# Patient Record
Sex: FEMALE | Race: WHITE
[De-identification: names, ages, dates, MRNs, and addresses within clinical notes are randomized per-mention and may not be internally consistent; named-entity substitution may affect disease eponyms.]

---

## 2017-06-23 ENCOUNTER — HOSPITAL ENCOUNTER (OUTPATIENT)
Dept: HOSPITAL 76 - DI | Age: 67
Discharge: HOME | End: 2017-06-23
Attending: FAMILY MEDICINE
Payer: MEDICARE

## 2017-06-23 DIAGNOSIS — M50.321: ICD-10-CM

## 2017-06-23 DIAGNOSIS — M47.892: Primary | ICD-10-CM

## 2017-06-23 PROCEDURE — 72141 MRI NECK SPINE W/O DYE: CPT

## 2017-06-23 NOTE — MRI REPORT
EXAM:

MRI CERVICAL SPINE WITHOUT CONTRAST

 

EXAM DATE: 6/23/2017 12:13 PM.

 

CLINICAL HISTORY: RADICULOPATHY, CERVICAL REGION.

 

COMPARISONS: MR cervical spine 3/9/2011.

 

TECHNIQUE: Multiplanar, multisequence T1-weighted and fluid-sensitive sequences of the cervical spine
 without contrast. Other: None.

 

FINDINGS: 

Neurologic Structures: There is flattening of the ventral aspect of the cervical cord seen at C4-C5 a
nd C6-C7 with no definite T2 signal hyperintense lesion seen within the cord.

 

Alignment: There is straightening of the normal cervical lordosis. No definite spinal listhesis or sc
oliotic curvature. Findings are similar to MR 3/9/2011.

 

Bone Marrow: There is mild to moderate endplate degenerative changes seen at C4-C5, C6-C7, C7-T1 with
 mild loss of disk at the distal desiccation. Remaining cervical levels demonstrate mild degenerative
 changes with minimal to mild loss of disk height. No acute fracture. No definite abnormal marrow sanjay
ma. No marrow replacing lesion.

 

Interspace Levels/Facets:

C1-C2: Arthritic changes as well as posterior pannus without significant spinal canal stenosis or daphne
ral foraminal narrowing.

 

C2-C3: Small central disk osteophyte complexes that efface the ventral thecal sac. Bilateral uncovert
ebral osteophyte and arthritic facet disease producing severe right and mild left neural foraminal na
rrowing.

 

C3-C4: Small central disk osteophyte complex producing mild spinal canal stenosis. Bilateral uncovert
ebral osteophyte and arthritic facet disease producing moderate to severe bilateral neural foraminal 
narrowing. Findings appear progressed.

 

C4-C5: Small amount of posterior disk osteophyte complex producing moderate spinal canal stenosis. Bi
lateral uncovertebral osteophyte and arthritic facet disease producing severe bilateral neural forami
nal narrowing. Findings appear progressed.

 

C5-C6: Central to right paracentral disk osteophyte complex producing mild-to-moderate spinal canal s
tenosis. Bilateral uncovertebral osteophyte and arthritic facet disease producing moderate left and m
ild right neural foraminal narrowing.

 

C6-C7: Small posterior disk osteophyte complex eccentric to the right producing moderate spinal canal
 stenosis. Bilateral uncovertebral osteophyte and facet disease producing moderate to severe bilatera
l neural foraminal narrowing.

 

C7-T1: Slight posterior disk bulge with no significant spinal canal stenosis. Proliferative facet dis
ease with moderate right and mild-to-moderate left neural foraminal narrowing.

 

Musculature: Normal. No edema or fatty atrophy.

 

Other: The paravertebral and prevertebral soft tissues are normal.

 

IMPRESSION: 

 

1. Straightening of the normal cervical lordosis.

 

2. Mild-to-moderate multilevel degenerative changes appear progressed from 3/9/2011.

 

C2-C3: No significant spinal canal stenosis. Severe right and mild left neural foraminal narrowing.

 

C3-C4: Mild spinal canal stenosis. Moderate to severe bilateral neural foraminal narrowing.

 

C4-C5: Moderate spinal canal stenosis. Severe bilateral neural foraminal narrowing. 

 

C5-C6: Mild to moderate spinal canal stenosis. Moderate left and mild right neural foraminal narrowin
g.

 

C6-C7: Moderate spinal canal stenosis. Moderate to severe bilateral neural foraminal narrowing.

 

C7-T1: No significant spinal canal stenosis. Moderate right and mild-to-moderate left neural foramina
l narrowing.

 

[facility]

Referring Provider Line: [phone]

 

SITE ID: [id number]

## 2018-01-17 ENCOUNTER — HOSPITAL ENCOUNTER (OUTPATIENT)
Dept: HOSPITAL 76 - DI | Age: 68
Discharge: HOME | End: 2018-01-17
Attending: FAMILY MEDICINE
Payer: MEDICARE

## 2018-01-17 DIAGNOSIS — R31.9: ICD-10-CM

## 2018-01-17 DIAGNOSIS — Z12.31: Primary | ICD-10-CM

## 2018-01-17 DIAGNOSIS — R10.9: Primary | ICD-10-CM

## 2018-01-17 PROCEDURE — 74176 CT ABD & PELVIS W/O CONTRAST: CPT

## 2018-01-17 PROCEDURE — 77067 SCR MAMMO BI INCL CAD: CPT

## 2018-01-17 NOTE — CT REPORT
DATE OF SERVICE: 01/17/2018

 

CT ABDOMEN AND PELVIS WITHOUT CONTRAST:  01/17/2018

 

CLINICAL INDICATION:  Left flank pain, hematuria.

 

TECHNIQUE:  Axial CT images of the abdomen and pelvis were obtained without oral or intravenous

contrast.  No previous CT is available for comparison.

 

FINDINGS:  Limited evaluation of the lung bases is unremarkable.

 

ABDOMEN:  Liver, spleen, pancreas and adrenal glands appear unremarkable, allowing for the lack

of intravenous contrast enhancement.  The kidneys are normal, with no evidence of 

hydronephrosis, nephrolithiasis, or perinephric collection.  No bowel dilatation, free gas, or 

free fluid is present.  The appendix is seen in the right lower quadrant, and is normal in 

caliber.  Postoperative changes of cholecystectomy are noted.  No abdominal adenopathy is 

appreciated.

 

PELVIS:  The pelvic organs appear unremarkable.  No distal hydroureter or ureterolithiasis is 

seen.  No free fluid is present.

 

Osseous structures demonstrate degenerative changes.

 

IMPRESSION:  NO EVIDENCE OF NEPHROLITHIASIS OR HYDRONEPHROSIS.  NO EVIDENT ETIOLOGY FOR 

PATIENT'S PAIN AND HEMATURIA.

 

In accordance with CT protocol optimization, one or more of the following dose reduction 

techniques were utilized for this exam:  automated exposure control, adjustment of mA and/or KV

based on patient size, or use of iterative reconstructive technique.

 

 

DD: 01/17/2018 15:21

TD: 01/17/2018 18:31

Job #: [id number]

## 2018-01-18 NOTE — MAMMOGRAPHY REPORT
DATE OF SERVICE: 01/17/2018

 

DIGITAL SCREENING MAMMOGRAM:  01/17/2018

 

CLINICAL INDICATION:  A 67-year-old for screening.

 

COMPARISON:  01/2016, 10/2012, 07/2011, 10/2009, 09/2009.

 

TECHNIQUE:  Routine CC and MLO projections were obtained of the breasts.

 

The breasts demonstrate scattered fibroglandular densities bilaterally.  Punctate, typically 

benign calcifications are present.  No suspicious masses, clustered microcalcifications, or 

regions of architectural distortion are identified.

 

IMPRESSION:  Benign findings.

 

RECOMMENDATIONS:  Routine annual screening unless otherwise clinically indicated.

 

BIRADS category 2 benign findings.

 

STANDARD QUALIFYING STATEMENTS

1.  This examination was reviewed with the aid of Computed-Aided Detection (CAD).

2.  A negative or benign imaging report should not delay biopsy if clinically suspicious 

findings are present.  Consider surgical consultation if warranted.  More than 5% of cancers 

are not identified by imaging.

3.  Dense breasts may obscure an underlying neoplasm.

 

 

DD: 01/18/2018 13:21

TD: 01/18/2018 19:04

Job #: [id number]

## 2018-02-26 ENCOUNTER — HOSPITAL ENCOUNTER (OUTPATIENT)
Dept: HOSPITAL 76 - LAB.WCP | Age: 68
Discharge: HOME | End: 2018-02-26
Attending: FAMILY MEDICINE
Payer: MEDICARE

## 2018-02-26 DIAGNOSIS — R94.5: Primary | ICD-10-CM

## 2018-02-26 LAB
ALBUMIN DIAFP-MCNC: 4.3 G/DL (ref 3.2–5.5)
ALBUMIN/GLOB SERPL: 1.5 {RATIO} (ref 1–2.2)
ALP SERPL-CCNC: 53 IU/L (ref 42–121)
ALT SERPL W P-5'-P-CCNC: 46 IU/L (ref 10–60)
ANION GAP SERPL CALCULATED.4IONS-SCNC: 7 MMOL/L (ref 6–13)
AST SERPL W P-5'-P-CCNC: 24 IU/L (ref 10–42)
BILIRUB BLD-MCNC: 0.6 MG/DL (ref 0.2–1)
BUN SERPL-MCNC: 14 MG/DL (ref 6–20)
CALCIUM UR-MCNC: 9.6 MG/DL (ref 8.5–10.3)
CHLORIDE SERPL-SCNC: 104 MMOL/L (ref 101–111)
CO2 SERPL-SCNC: 24 MMOL/L (ref 21–32)
CREAT SERPLBLD-SCNC: 0.5 MG/DL (ref 0.4–1)
GFRSERPLBLD MDRD-ARVRAT: 123 ML/MIN/{1.73_M2} (ref 89–?)
GLOBULIN SER-MCNC: 2.9 G/DL (ref 2.1–4.2)
GLUCOSE SERPL-MCNC: 113 MG/DL (ref 70–100)
PROT SPEC-MCNC: 7.2 G/DL (ref 6.7–8.2)
SODIUM SERPLBLD-SCNC: 135 MMOL/L (ref 135–145)

## 2018-02-26 PROCEDURE — 80053 COMPREHEN METABOLIC PANEL: CPT

## 2018-02-26 PROCEDURE — 36415 COLL VENOUS BLD VENIPUNCTURE: CPT

## 2018-05-25 ENCOUNTER — HOSPITAL ENCOUNTER (OUTPATIENT)
Dept: HOSPITAL 76 - LAB.WCP | Age: 68
Discharge: HOME | End: 2018-05-25
Attending: FAMILY MEDICINE
Payer: MEDICARE

## 2018-05-25 DIAGNOSIS — R73.01: ICD-10-CM

## 2018-05-25 DIAGNOSIS — E78.5: Primary | ICD-10-CM

## 2018-05-25 LAB
ALBUMIN DIAFP-MCNC: 4.4 G/DL (ref 3.2–5.5)
ALBUMIN/GLOB SERPL: 1.6 {RATIO} (ref 1–2.2)
ALP SERPL-CCNC: 52 IU/L (ref 42–121)
ALT SERPL W P-5'-P-CCNC: 58 IU/L (ref 10–60)
ANION GAP SERPL CALCULATED.4IONS-SCNC: 7 MMOL/L (ref 6–13)
AST SERPL W P-5'-P-CCNC: 36 IU/L (ref 10–42)
BILIRUB BLD-MCNC: 0.8 MG/DL (ref 0.2–1)
BUN SERPL-MCNC: 11 MG/DL (ref 6–20)
CALCIUM UR-MCNC: 9.5 MG/DL (ref 8.5–10.3)
CHLORIDE SERPL-SCNC: 102 MMOL/L (ref 101–111)
CHOLEST SERPL-MCNC: 192 MG/DL
CO2 SERPL-SCNC: 28 MMOL/L (ref 21–32)
CREAT SERPLBLD-SCNC: 0.4 MG/DL (ref 0.4–1)
EST. AVERAGE GLUCOSE BLD GHB EST-MCNC: 111 MG/DL (ref 70–100)
GFRSERPLBLD MDRD-ARVRAT: 159 ML/MIN/{1.73_M2} (ref 89–?)
GLOBULIN SER-MCNC: 2.8 G/DL (ref 2.1–4.2)
GLUCOSE SERPL-MCNC: 112 MG/DL (ref 70–100)
HB2 TOTAL: 15.8 G/DL
HBA1C BLD-MCNC: 0.58 G/DL
HDLC SERPL-MCNC: 75 MG/DL
HDLC SERPL: 2.6 {RATIO} (ref ?–4.4)
HEMOGLOBIN A1C %: 5.5 % (ref 4.6–6.2)
LDLC SERPL CALC-MCNC: 102 MG/DL
LDLC/HDLC SERPL: 1.4 {RATIO} (ref ?–4.4)
PROT SPEC-MCNC: 7.2 G/DL (ref 6.7–8.2)
SODIUM SERPLBLD-SCNC: 137 MMOL/L (ref 135–145)
VLDLC SERPL-SCNC: 15 MG/DL

## 2018-05-25 PROCEDURE — 80053 COMPREHEN METABOLIC PANEL: CPT

## 2018-05-25 PROCEDURE — 83036 HEMOGLOBIN GLYCOSYLATED A1C: CPT

## 2018-05-25 PROCEDURE — 80061 LIPID PANEL: CPT

## 2018-05-25 PROCEDURE — 83721 ASSAY OF BLOOD LIPOPROTEIN: CPT

## 2018-05-25 PROCEDURE — 36415 COLL VENOUS BLD VENIPUNCTURE: CPT

## 2018-12-30 ENCOUNTER — HOSPITAL ENCOUNTER (EMERGENCY)
Dept: HOSPITAL 76 - ED | Age: 68
Discharge: HOME | End: 2018-12-30
Payer: MEDICARE

## 2018-12-30 VITALS — DIASTOLIC BLOOD PRESSURE: 72 MMHG | SYSTOLIC BLOOD PRESSURE: 112 MMHG

## 2018-12-30 DIAGNOSIS — R55: Primary | ICD-10-CM

## 2018-12-30 DIAGNOSIS — E78.00: ICD-10-CM

## 2018-12-30 DIAGNOSIS — R11.2: ICD-10-CM

## 2018-12-30 DIAGNOSIS — R19.7: ICD-10-CM

## 2018-12-30 LAB
ALBUMIN DIAFP-MCNC: 4.2 G/DL (ref 3.2–5.5)
ALBUMIN/GLOB SERPL: 1.4 {RATIO} (ref 1–2.2)
ALP SERPL-CCNC: 66 IU/L (ref 42–121)
ALT SERPL W P-5'-P-CCNC: 154 IU/L (ref 10–60)
ANION GAP SERPL CALCULATED.4IONS-SCNC: 10 MMOL/L (ref 6–13)
AST SERPL W P-5'-P-CCNC: 104 IU/L (ref 10–42)
BASOPHILS NFR BLD AUTO: 0 10^3/UL (ref 0–0.1)
BASOPHILS NFR BLD AUTO: 0.2 %
BILIRUB BLD-MCNC: 1 MG/DL (ref 0.2–1)
BUN SERPL-MCNC: 10 MG/DL (ref 6–20)
CALCIUM UR-MCNC: 8.9 MG/DL (ref 8.5–10.3)
CHLORIDE SERPL-SCNC: 101 MMOL/L (ref 101–111)
CK SERPL-CCNC: 123 IU/L (ref 22–269)
CO2 SERPL-SCNC: 24 MMOL/L (ref 21–32)
CREAT SERPLBLD-SCNC: 0.5 MG/DL (ref 0.4–1)
EOSINOPHIL # BLD AUTO: 0 10^3/UL (ref 0–0.7)
EOSINOPHIL NFR BLD AUTO: 0.2 %
ERYTHROCYTE [DISTWIDTH] IN BLOOD BY AUTOMATED COUNT: 13.4 % (ref 12–15)
GFRSERPLBLD MDRD-ARVRAT: 123 ML/MIN/{1.73_M2} (ref 89–?)
GLOBULIN SER-MCNC: 3 G/DL (ref 2.1–4.2)
GLUCOSE SERPL-MCNC: 140 MG/DL (ref 70–100)
HGB UR QL STRIP: 14.1 G/DL (ref 12–16)
LIPASE SERPL-CCNC: 41 U/L (ref 22–51)
LYMPHOCYTES # SPEC AUTO: 0.9 10^3/UL (ref 1.5–3.5)
LYMPHOCYTES NFR BLD AUTO: 6.1 %
MAGNESIUM SERPL-MCNC: 1.7 MG/DL (ref 1.7–2.8)
MCH RBC QN AUTO: 29.7 PG (ref 27–31)
MCHC RBC AUTO-ENTMCNC: 34.2 G/DL (ref 32–36)
MCV RBC AUTO: 86.6 FL (ref 81–99)
MONOCYTES # BLD AUTO: 0.4 10^3/UL (ref 0–1)
MONOCYTES NFR BLD AUTO: 3.2 %
NEUTROPHILS # BLD AUTO: 12.7 10^3/UL (ref 1.5–6.6)
NEUTROPHILS # SNV AUTO: 14.1 X10^3/UL (ref 4.8–10.8)
NEUTROPHILS NFR BLD AUTO: 90.3 %
PDW BLD AUTO: 7.4 FL (ref 7.9–10.8)
PLATELET # BLD: 306 10^3/UL (ref 130–450)
PROT SPEC-MCNC: 7.2 G/DL (ref 6.7–8.2)
RBC MAR: 4.76 10^6/UL (ref 4.2–5.4)
SODIUM SERPLBLD-SCNC: 135 MMOL/L (ref 135–145)

## 2018-12-30 PROCEDURE — 93005 ELECTROCARDIOGRAM TRACING: CPT

## 2018-12-30 PROCEDURE — 83735 ASSAY OF MAGNESIUM: CPT

## 2018-12-30 PROCEDURE — 36415 COLL VENOUS BLD VENIPUNCTURE: CPT

## 2018-12-30 PROCEDURE — 82550 ASSAY OF CK (CPK): CPT

## 2018-12-30 PROCEDURE — 80053 COMPREHEN METABOLIC PANEL: CPT

## 2018-12-30 PROCEDURE — 85025 COMPLETE CBC W/AUTO DIFF WBC: CPT

## 2018-12-30 PROCEDURE — 83690 ASSAY OF LIPASE: CPT

## 2018-12-30 PROCEDURE — 96361 HYDRATE IV INFUSION ADD-ON: CPT

## 2018-12-30 PROCEDURE — 99283 EMERGENCY DEPT VISIT LOW MDM: CPT

## 2018-12-30 PROCEDURE — 96374 THER/PROPH/DIAG INJ IV PUSH: CPT

## 2018-12-30 PROCEDURE — 84484 ASSAY OF TROPONIN QUANT: CPT

## 2018-12-30 NOTE — ED PHYSICIAN DOCUMENTATION
PD HPI SYNCOPE





- Stated complaint


Stated Complaint: SYNCOPE





- History obtained from


History obtained from: Patient





- History of Present Illness


Witnessed: Unwitnessed


Timing - onset: Last night


Duration: Unknown


Preceding symptoms: Nausea / vomiting (The patient states she felt somewhat 

general malaise chills and general weakness during the day yesterday and then de

veloped nausea and diarrhea overnight.  She got up to go to the bathroom and 

felt both nauseous without any vomiting and also had diarrhea.  She felt 

lightheaded with that and then awoke on the floor.  She states she went to get 

up from there and then apparently passed out again awaking on the floor again.  

She got up slowly and went back to bed.  She woke this morning with still 

feeling of some nausea and general weakness but no near syncope.  She was 

concerned about potential heart related causes or blood sugar such and so came 

in for evaluation.), Light headed, Generalized weakness


Associated symptoms: Nausea / vomiting.  No: Chest pain, Palpitations, Abdominal

pain


Contributing factors: Noxious stimulae (nausea and diarrhea), Just stood up


Injury occurred: Fell.  No: Head injury, Neck injury


Similar symptoms before: Has not had sx before


Recently seen: Not recently seen





Review of Systems


Constitutional: reports: Chills, Myalgias, Fatigue.  denies: Fever


Eyes: denies: Loss of vision


Nose: denies: Rhinorrhea / runny nose, Congestion


Throat: denies: Sore throat


Respiratory: denies: Cough


GI: reports: Abdominal Pain (cramping intermittent), Nausea, Diarrhea.  denies: 

Abdominal Swelling, Vomiting


: denies: Dysuria, Frequency


Neurologic: reports: Generalized weakness.  denies: Focal weakness, Numbness, 

Altered mental status, Headache, Head injury


Endocrine: denies: Weight loss


Immunocompromised: denies: Immunocompromised, Chemotherapy





PD PAST MEDICAL HISTORY





- Past Medical History


Cardiovascular: High cholesterol


Respiratory: Asthma


Neuro: None


GI: Cholelithiasis


Psych: Depression, Anxiety


Musculoskeletal: Osteoarthritis





- Past Surgical History


Past Surgical History: Yes


General: Cholecystectomy, Colonoscopy


HEENT: Tonsil/Adenoidectomy





- Present Medications


Home Medications: 


                                Ambulatory Orders











 Medication  Instructions  Recorded  Confirmed


 


Citalopram [CeleXA] 20 mg PO DAILY 12/18/13 12/18/13


 


Lovastatin [Mevacor] 20 mg PO 12/18/13 12/18/13


 


Mometasone/Formoterol [Dulera 200 DAILY 12/18/13 12/18/13





Mcg/5 Mcg Inhaler]   


 


Tizanidine HCl 4 mg PO DAILY 12/18/13 12/18/13


 


Tramadol HCl [Ultram] 50 mg PO PRN 12/18/13 12/18/13


 


Loratadine/Pseudoephedrine 1 each PO PRN 12/19/13 12/19/13





[Claritin-D 24 Hour Tablet]   


 


Benzonatate [Tessalon] 100 mg PO TID PRN #20 capsule 05/07/16 


 


Doxycycline Hyclate 100 mg PO BID 10 Days  capsule 05/07/16 


 


Diphenoxylate/Atropine [Lomotil] 1 each PO QID PRN #12 tablet 12/30/18 


 


Ondansetron Odt [Zofran] 4 mg TL Q6H PRN #10 tablet 12/30/18 














- Allergies


Allergies/Adverse Reactions: 


                                    Allergies











Allergy/AdvReac Type Severity Reaction Status Date / Time


 


Penicillins Allergy  Rash Verified 12/18/13 09:42














- Social History


Does the pt smoke?: No


Smoking Status: Never smoker





PD ED PE NORMAL





- Vitals


Vital signs reviewed: Yes





- General


General: Alert and oriented X 3, No acute distress, Well developed/nourished





- HEENT


HEENT: Pharynx benign





- Neck


Neck: Supple, no meningeal sign, No adenopathy





- Cardiac


Cardiac: RRR, No murmur





- Respiratory


Respiratory: Clear bilaterally





- Abdomen


Abdomen: Normal bowel sounds, Soft, Non tender, Non distended





- Derm


Derm: Normal color, Warm and dry





- Extremities


Extremities: No tenderness to palpate, Normal ROM s pain, No edema, No calf 

tenderness / cord





- Neuro


Neuro: Alert and oriented X 3, No motor deficit, Normal speech


Eye Opening: Spontaneous


Motor: Obeys Commands


Verbal: Oriented


GCS Score: 15





Results





- Vitals


Vitals: 


                               Vital Signs - 24 hr











  12/30/18 12/30/18





  05:37 06:38


 


Temperature 37.0 C 


 


Heart Rate 119 H 76


 


Respiratory 20 12





Rate  


 


Blood Pressure 113/76 112/72


 


O2 Saturation 96 99








                                     Oxygen











O2 Source                      Room air

















- EKG (time done)


  ** 06:01


Rate: Rate (enter#) (86)


Rhythm: NSR


Axis: Normal


Intervals: Normal OH


QRS: Normal


Ischemia: Normal ST segments.  No: ST elevation c/w ischemia, ST depression





- Labs


Labs: 


                                Laboratory Tests











  12/30/18 12/30/18 12/30/18





  06:03 06:03 06:03


 


WBC  14.1 H  


 


RBC  4.76  


 


Hgb  14.1  


 


Hct  41.2  


 


MCV  86.6  


 


MCH  29.7  


 


MCHC  34.2  


 


RDW  13.4  


 


Plt Count  306  


 


MPV  7.4 L  


 


Neut # (Auto)  12.7 H  


 


Lymph # (Auto)  0.9 L  


 


Mono # (Auto)  0.4  


 


Eos # (Auto)  0.0  


 


Baso # (Auto)  0.0  


 


Absolute Nucleated RBC  0.01  


 


Nucleated RBC %  0.0  


 


Sodium   135 


 


Potassium   3.5 


 


Chloride   101 


 


Carbon Dioxide   24 


 


Anion Gap   10.0 


 


BUN   10 


 


Creatinine   0.5 


 


Estimated GFR (MDRD)   123 


 


Glucose   140 H 


 


Calcium   8.9 


 


Magnesium   1.7 


 


Total Bilirubin   1.0 


 


AST   104 H 


 


ALT   154 H 


 


Alkaline Phosphatase   66 


 


Total Creatine Kinase   123 


 


Troponin I    < 0.04


 


Total Protein   7.2 


 


Albumin   4.2 


 


Globulin   3.0 


 


Albumin/Globulin Ratio   1.4 


 


Lipase   41 














PD MEDICAL DECISION MAKING





- ED course


Complexity details: reviewed results, re-evaluated patient (feeling better with 

IV fluids and meds. ), considered differential, d/w patient





Departure





- Departure


Disposition: 01 Home, Self Care


Clinical Impression: 


 Nausea vomiting and diarrhea





Syncope


Qualifiers:


 Syncope type: vasovagal syncope Qualified Code(s): R55 - Syncope and collapse





Condition: Stable


Record reviewed to determine appropriate education?: Yes


Instructions:  ED Syncope Vasovagal, ED Gastroenteritis Vs Food Poison


Prescriptions: 


Diphenoxylate/Atropine [Lomotil] 1 each PO QID PRN #12 tablet


 PRN Reason: Diarrhea


Ondansetron Odt [Zofran] 4 mg TL Q6H PRN #10 tablet


 PRN Reason: Nausea / Vomiting


Comments: 


Your fainting episode last night sounds like it relates to the nausea and 

diarrhea and likely was a transient drop in blood pressure related to the 

symptoms.  Here today there is no signs of heart attack, anemia, diabetes, 

electrolyte problems nor abnormal heart rhythm.  I would likely predict some 

nausea and diarrhea through the day today.  Drink lots of fluids.  Ondansetron 

if needed for nausea and Lomotil for diarrhea.  Not too much strenuous activity.

  I would not anticipate further fainting episodes.  Follow-up with your primary

 care if you have recurring lightheadedness or any fainting beyond the scope of 

the current illness.

## 2019-02-06 ENCOUNTER — HOSPITAL ENCOUNTER (OUTPATIENT)
Dept: HOSPITAL 76 - LAB.WCP | Age: 69
Discharge: HOME | End: 2019-02-06
Attending: FAMILY MEDICINE
Payer: MEDICARE

## 2019-02-06 DIAGNOSIS — Z79.899: ICD-10-CM

## 2019-02-06 DIAGNOSIS — R73.01: ICD-10-CM

## 2019-02-06 DIAGNOSIS — E78.5: Primary | ICD-10-CM

## 2019-02-06 LAB
ALBUMIN DIAFP-MCNC: 4.1 G/DL (ref 3.2–5.5)
ALBUMIN/GLOB SERPL: 1.4 {RATIO} (ref 1–2.2)
ALP SERPL-CCNC: 51 IU/L (ref 42–121)
ALT SERPL W P-5'-P-CCNC: 39 IU/L (ref 10–60)
ANION GAP SERPL CALCULATED.4IONS-SCNC: 11 MMOL/L (ref 6–13)
AST SERPL W P-5'-P-CCNC: 23 IU/L (ref 10–42)
BASOPHILS NFR BLD AUTO: 0.1 10^3/UL (ref 0–0.1)
BASOPHILS NFR BLD AUTO: 1 %
BILIRUB BLD-MCNC: 0.6 MG/DL (ref 0.2–1)
BUN SERPL-MCNC: 11 MG/DL (ref 6–20)
CALCIUM UR-MCNC: 9.4 MG/DL (ref 8.5–10.3)
CHLORIDE SERPL-SCNC: 101 MMOL/L (ref 101–111)
CHOLEST SERPL-MCNC: 244 MG/DL
CO2 SERPL-SCNC: 25 MMOL/L (ref 21–32)
CREAT SERPLBLD-SCNC: 0.6 MG/DL (ref 0.4–1)
CREAT UR-SCNC: 37.5 MG/DL
EOSINOPHIL # BLD AUTO: 0.1 10^3/UL (ref 0–0.7)
EOSINOPHIL NFR BLD AUTO: 1.2 %
ERYTHROCYTE [DISTWIDTH] IN BLOOD BY AUTOMATED COUNT: 13.7 % (ref 12–15)
EST. AVERAGE GLUCOSE BLD GHB EST-MCNC: 105 MG/DL (ref 70–100)
GFRSERPLBLD MDRD-ARVRAT: 99 ML/MIN/{1.73_M2} (ref 89–?)
GLOBULIN SER-MCNC: 2.9 G/DL (ref 2.1–4.2)
GLUCOSE SERPL-MCNC: 125 MG/DL (ref 70–100)
HB2 TOTAL: 15.6 G/DL
HBA1C BLD-MCNC: 0.54 G/DL
HDLC SERPL-MCNC: 88 MG/DL
HDLC SERPL: 2.8 {RATIO} (ref ?–4.4)
HEMOGLOBIN A1C %: 5.3 % (ref 4.6–6.2)
HGB UR QL STRIP: 14.4 G/DL (ref 12–16)
LDLC SERPL CALC-MCNC: 133 MG/DL
LDLC/HDLC SERPL: 1.5 {RATIO} (ref ?–4.4)
LYMPHOCYTES # SPEC AUTO: 2 10^3/UL (ref 1.5–3.5)
LYMPHOCYTES NFR BLD AUTO: 35.2 %
MCH RBC QN AUTO: 29.8 PG (ref 27–31)
MCHC RBC AUTO-ENTMCNC: 34.1 G/DL (ref 32–36)
MCV RBC AUTO: 87.4 FL (ref 81–99)
MICROALBUMIN UR-MCNC: < 0.2 MG/DL (ref 0–300)
MONOCYTES # BLD AUTO: 0.3 10^3/UL (ref 0–1)
MONOCYTES NFR BLD AUTO: 6 %
NEUTROPHILS # BLD AUTO: 3.2 10^3/UL (ref 1.5–6.6)
NEUTROPHILS # SNV AUTO: 5.6 X10^3/UL (ref 4.8–10.8)
NEUTROPHILS NFR BLD AUTO: 56.6 %
PDW BLD AUTO: 7.9 FL (ref 7.9–10.8)
PLATELET # BLD: 348 10^3/UL (ref 130–450)
PROT SPEC-MCNC: 7 G/DL (ref 6.7–8.2)
RBC MAR: 4.82 10^6/UL (ref 4.2–5.4)
SODIUM SERPLBLD-SCNC: 137 MMOL/L (ref 135–145)
VLDLC SERPL-SCNC: 23 MG/DL

## 2019-02-06 PROCEDURE — 80053 COMPREHEN METABOLIC PANEL: CPT

## 2019-02-06 PROCEDURE — 83036 HEMOGLOBIN GLYCOSYLATED A1C: CPT

## 2019-02-06 PROCEDURE — 83721 ASSAY OF BLOOD LIPOPROTEIN: CPT

## 2019-02-06 PROCEDURE — 84443 ASSAY THYROID STIM HORMONE: CPT

## 2019-02-06 PROCEDURE — 80061 LIPID PANEL: CPT

## 2019-02-06 PROCEDURE — 82043 UR ALBUMIN QUANTITATIVE: CPT

## 2019-02-06 PROCEDURE — 82570 ASSAY OF URINE CREATININE: CPT

## 2019-02-06 PROCEDURE — 36415 COLL VENOUS BLD VENIPUNCTURE: CPT

## 2019-02-06 PROCEDURE — 85025 COMPLETE CBC W/AUTO DIFF WBC: CPT

## 2019-06-22 ENCOUNTER — HOSPITAL ENCOUNTER (EMERGENCY)
Dept: HOSPITAL 76 - ED | Age: 69
Discharge: HOME | End: 2019-06-22
Payer: MEDICARE

## 2019-06-22 VITALS — DIASTOLIC BLOOD PRESSURE: 86 MMHG | SYSTOLIC BLOOD PRESSURE: 117 MMHG

## 2019-06-22 DIAGNOSIS — Z87.891: ICD-10-CM
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
## 2019-06-22 DIAGNOSIS — Z23: ICD-10-CM
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
## 2019-06-22 DIAGNOSIS — S61.251A: Primary | ICD-10-CM

## 2019-06-22 DIAGNOSIS — Z88.0: ICD-10-CM

## 2019-06-22 PROCEDURE — 90715 TDAP VACCINE 7 YRS/> IM: CPT

## 2019-06-22 PROCEDURE — 99282 EMERGENCY DEPT VISIT SF MDM: CPT

## 2019-06-22 PROCEDURE — 99283 EMERGENCY DEPT VISIT LOW MDM: CPT

## 2019-06-22 PROCEDURE — 90471 IMMUNIZATION ADMIN: CPT

## 2019-06-22 NOTE — ED PHYSICIAN DOCUMENTATION
PD HPI UPPER EXT INJURY





- Stated complaint


Stated Complaint: DOG BITE LT INDEX FINGER





- Chief complaint


Chief Complaint: Laceration





- History obtained from


History obtained from: Patient





- History of Present Illness


Location: Left, Finger (index)


Type of injury: Other (dog bite)


Where injury occurred: Home


Timing - onset: Yesterday


Timing - duration: Days


Timing - details: Abrupt onset


Pain level max: 6


Pain level now: 5


Improved by: Rest


Worsened by: Moving, Palpating


Associated symptoms: Swelling.  No: Weakness, Numbness, Tingling


Recently seen: Not recently seen





- Additonal information


Additional information: 





dog was killed by a [place] roa last night, during altercation was bit by 

her dog in L index finger. unknown last Td. 





Review of Systems


Constitutional: denies: Fever


Respiratory: denies: Cough


GI: denies: Vomiting





PD PAST MEDICAL HISTORY





- Past Medical History


Past Medical History: Yes


Cardiovascular: High cholesterol


Respiratory: Asthma


Neuro: None


GI: Cholelithiasis


Psych: Depression, Anxiety


Musculoskeletal: Osteoarthritis





- Past Surgical History


Past Surgical History: Yes


General: Cholecystectomy, Colonoscopy


HEENT: Tonsil/Adenoidectomy





- Present Medications


Home Medications: 


                                Ambulatory Orders











 Medication  Instructions  Recorded  Confirmed


 


Citalopram [CeleXA] 20 mg PO DAILY 12/18/13 12/18/13


 


Lovastatin [Mevacor] 20 mg PO 12/18/13 12/18/13


 


Mometasone/Formoterol [Dulera 200 DAILY 12/18/13 12/18/13





Mcg/5 Mcg Inhaler]   


 


Tizanidine HCl 4 mg PO DAILY 12/18/13 12/18/13


 


Tramadol HCl [Ultram] 50 mg PO PRN 12/18/13 12/18/13


 


Loratadine/Pseudoephedrine 1 each PO PRN 12/19/13 12/19/13





[Claritin-D 24 Hour Tablet]   


 


Benzonatate [Tessalon] 100 mg PO TID PRN #20 capsule 05/07/16 


 


Doxycycline Hyclate 100 mg PO BID 10 Days  capsule 05/07/16 


 


Diphenoxylate/Atropine [Lomotil] 1 each PO QID PRN #12 tablet 12/30/18 


 


Ondansetron Odt [Zofran] 4 mg TL Q6H PRN #10 tablet 12/30/18 


 


Clindamycin HCl [Clindamycin 300MG 300 mg PO Q6H #40 capsule 06/22/19 





CAP]   


 


Hydrocodone/Acetaminophen 1 - 2 each PO Q6H PRN #5 tablet 06/22/19 





[Hydrocodon-Acetaminophen 5-325]   














- Allergies


Allergies/Adverse Reactions: 


                                    Allergies











Allergy/AdvReac Type Severity Reaction Status Date / Time


 


Penicillins Allergy  Rash Verified 12/18/13 09:42














- Social History


Does the pt smoke?: No


Smoking Status: Former smoker


Does the pt drink ETOH?: No


Does the pt have substance abuse?: No





- Immunizations


Immunizations are current?: No


Immunizations: TDAP >10years/unknown





PD ED PE NORMAL





- Vitals


Vital signs reviewed: Yes





- General


General: Alert and oriented X 3, No acute distress





- Derm


Derm: Warm and dry





- Extremities


Extremities: Other (superficial lacerations to distal aspect of the index finger

on the L hand)





- Neuro


Neuro: Alert and oriented X 3





Results





- Vitals


Vitals: 


                               Vital Signs - 24 hr











  06/22/19





  06:32


 


Temperature 37.0 C


 


Heart Rate 86


 


Respiratory 20





Rate 


 


Blood Pressure 117/86 H


 


O2 Saturation 95








                                     Oxygen











O2 Source                      Room air

















PD MEDICAL DECISION MAKING





- ED course


Complexity details: considered differential, d/w patient


ED course: 





Patient with a left index finger laceration, several of them that appear mostly 

superficial.  No suturing required.  Wounds were cleansed and bandaged with 

bacitracin and a Band-Aid.  Neurovascularly intact.  Will prescribe clindamycin 

for home as she is allergic to penicillin.  Tetanus given.  Patient counseled 

regarding signs and symptoms for which I believe and urgent re-evaluation would 

be necessary. Patient with good understanding of and agreement to plan and is 

comfortable going home at this time





This document was made in part using voice recognition software. While efforts 

are made to proofread this document, sound alike and grammatical errors may 

occur.





Departure





- Departure


Disposition: 01 Home, Self Care


Clinical Impression: 


Dog bite


Qualifiers:


 Encounter type: initial encounter Qualified Code(s): W54.0XXA - Bitten by dog, 

initial encounter





Condition: Good


Health Concerns: 


dog bite


Plan of Treatment: 


clinda


Care Goals: 


prevent infection


Assessment: 


dog bite


Instructions:  ED Bite Animal General


Follow-Up: 


Francine Vasquez DO [Primary Care Provider] - Within 1 week


Prescriptions: 


Clindamycin HCl [Clindamycin 300MG CAP] 300 mg PO Q6H #40 capsule


Hydrocodone/Acetaminophen [Hydrocodon-Acetaminophen 5-325] 1 - 2 each PO Q6H PRN

#5 tablet


 PRN Reason: pain


Comments: 


Take all antibiotics until gone.  Return if you worsen.  Follow-up with your 

doctor for further care.


Do not drink alcohol or drive while on narcotic pain medicine. 


Note that many narcotic pain relievers also contain tylenol/acetaminophen. 

Please ensure that your total dose of acetaminophen from all sources does not 

exceed 3 grams (3000mg) per day. 


You may constipated on this medication, take a stool softener such as "Colace" 

twice a day while you are on it. Also recommend a over-the-counter laxative such

as senna or MiraLAX any day that you do not have a bowel movement. 


If you received narcotic pain medication in the emergency department, do not 

drive or operate machinery for the next 24 hours.